# Patient Record
Sex: MALE | Race: AMERICAN INDIAN OR ALASKA NATIVE
[De-identification: names, ages, dates, MRNs, and addresses within clinical notes are randomized per-mention and may not be internally consistent; named-entity substitution may affect disease eponyms.]

---

## 2017-02-06 ENCOUNTER — HOSPITAL ENCOUNTER (INPATIENT)
Dept: HOSPITAL 5 - ED | Age: 47
LOS: 1 days | Discharge: HOME | DRG: 699 | End: 2017-02-07
Attending: INTERNAL MEDICINE | Admitting: INTERNAL MEDICINE
Payer: MEDICARE

## 2017-02-06 DIAGNOSIS — Z82.49: ICD-10-CM

## 2017-02-06 DIAGNOSIS — Z99.2: ICD-10-CM

## 2017-02-06 DIAGNOSIS — I12.0: ICD-10-CM

## 2017-02-06 DIAGNOSIS — R63.4: ICD-10-CM

## 2017-02-06 DIAGNOSIS — R65.10: ICD-10-CM

## 2017-02-06 DIAGNOSIS — R18.8: ICD-10-CM

## 2017-02-06 DIAGNOSIS — N18.6: ICD-10-CM

## 2017-02-06 DIAGNOSIS — D64.9: ICD-10-CM

## 2017-02-06 DIAGNOSIS — N28.1: Primary | ICD-10-CM

## 2017-02-06 DIAGNOSIS — E83.51: ICD-10-CM

## 2017-02-06 LAB
ANION GAP SERPL CALC-SCNC: 25 MMOL/L
BASOPHILS NFR BLD AUTO: 0.4 % (ref 0–1.8)
BUN SERPL-MCNC: 54 MG/DL (ref 9–20)
BUN/CREAT SERPL: 2.85 %
CALCIUM SERPL-MCNC: 7.2 MG/DL (ref 8.4–10.2)
CHLORIDE SERPL-SCNC: 86.2 MMOL/L (ref 98–107)
CHOLEST SERPL-MCNC: 145 MG/DL (ref 50–199)
CO2 SERPL-SCNC: 28 MMOL/L (ref 22–30)
EOSINOPHIL NFR BLD AUTO: 0.6 % (ref 0–4.3)
GLUCOSE SERPL-MCNC: 103 MG/DL (ref 75–100)
HCT VFR BLD CALC: 27 % (ref 35.5–45.6)
HDLC SERPL-MCNC: 38 MG/DL (ref 40–59)
HGB BLD-MCNC: 8.4 GM/DL (ref 11.8–15.2)
MCH RBC QN AUTO: 24 PG (ref 28–32)
MCHC RBC AUTO-ENTMCNC: 31 % (ref 32–34)
MCV RBC AUTO: 77 FL (ref 84–94)
PLATELET # BLD: 203 K/MM3 (ref 140–440)
POTASSIUM SERPL-SCNC: 3.3 MMOL/L (ref 3.6–5)
RBC # BLD AUTO: 3.52 M/MM3 (ref 3.65–5.03)
SODIUM SERPL-SCNC: 136 MMOL/L (ref 137–145)
TRIGL SERPL-MCNC: 89 MG/DL (ref 2–149)
WBC # BLD AUTO: 11.6 K/MM3 (ref 4.5–11)

## 2017-02-06 PROCEDURE — 82553 CREATINE MB FRACTION: CPT

## 2017-02-06 PROCEDURE — 89051 BODY FLUID CELL COUNT: CPT

## 2017-02-06 PROCEDURE — 93010 ELECTROCARDIOGRAM REPORT: CPT

## 2017-02-06 PROCEDURE — 87116 MYCOBACTERIA CULTURE: CPT

## 2017-02-06 PROCEDURE — 71020: CPT

## 2017-02-06 PROCEDURE — 93306 TTE W/DOPPLER COMPLETE: CPT

## 2017-02-06 PROCEDURE — 80061 LIPID PANEL: CPT

## 2017-02-06 PROCEDURE — 87040 BLOOD CULTURE FOR BACTERIA: CPT

## 2017-02-06 PROCEDURE — 74176 CT ABD & PELVIS W/O CONTRAST: CPT

## 2017-02-06 PROCEDURE — 82550 ASSAY OF CK (CPK): CPT

## 2017-02-06 PROCEDURE — 76770 US EXAM ABDO BACK WALL COMP: CPT

## 2017-02-06 PROCEDURE — 93005 ELECTROCARDIOGRAM TRACING: CPT

## 2017-02-06 PROCEDURE — 87400 INFLUENZA A/B EACH AG IA: CPT

## 2017-02-06 PROCEDURE — 84484 ASSAY OF TROPONIN QUANT: CPT

## 2017-02-06 PROCEDURE — 36415 COLL VENOUS BLD VENIPUNCTURE: CPT

## 2017-02-06 PROCEDURE — 85025 COMPLETE CBC W/AUTO DIFF WBC: CPT

## 2017-02-06 PROCEDURE — 96361 HYDRATE IV INFUSION ADD-ON: CPT

## 2017-02-06 PROCEDURE — 96374 THER/PROPH/DIAG INJ IV PUSH: CPT

## 2017-02-06 PROCEDURE — 80048 BASIC METABOLIC PNL TOTAL CA: CPT

## 2017-02-06 NOTE — ADMIT CRITERIA FORM
Admission Criteria Documentation: 





                         RENAL FAILURE, CHRONIC





Clinical Indications for Admission to Inpatient Care





                                                                     (Place 'X' 

for any and all applicable criteria):





Admission is indicated for ANY ONE of the following (1)(2)(3)(4)(5):


[X ]I.   Inpatient admission required rather than observation care (Use Renal 

Failure, Chronic: Observation Care 


        Criteria as appropriate) because of ANY ONE of the following:


                [ ]a)   Volume overload or uremic symptoms (eg, clinically 

significant pulmonary edema, hypertension, 


                        pericarditis, acidosis) too severe for, or not 

responsive (eg, for over 24 hours) to emergency 


                        department or observation care dialysis or treatment 

regimen (11)


                [ ]b)  Hemodynamic instability that is severe or persistent


                [ ]c)  Respiratory distress that is severe or persistent (11)


                [ ]d)  Clinically significant electrolyte abnormality that 

requires inpatient care (eg,hyperkalemia with 


                        severe ECG findings)[B]


                [ ]e)  Supplement O2 or respiratory therapy for over 24hrs that 

is performable only in acute inpatient setting


                [ ]f)   Continuous IV infusion of anticoagulation, platelet 

inhibitor, vasoactive, or Antiarrhythmic medication (15),


                [ ]g)  Pulmonary artery catheter monitoring               


                [ ]h)  Temporary pacemaker placement


                [ ]i)   Emergent pericardiocentesis                      


                [X ]j)   Other condition, treatment or monitoring requiring 

inpatient admission


[ ]II.   Unexplained syncope [A]


[ ]III.  Recurrent seizures 


[ ]IV. Severe infections not treatable in outpatient setting (eg, peritonitis)(9

)


[ ]V.  Cardiac arrhythmias of immediate concern


[ ]VI. Encephalopathy


[ ]VII.Bleeding abnormalities (eg, platelet dysfunction) with active (eg, 

gastrointestinal)  bleeding











Extended stay beyond goal length of stay may be needed for (3)(4)(35)(36):


[ ]a)   Continuing uremic complications


[ ]b)   Comorbidities or complications


   


     


                                                                          


The original [employer] content created by [employer] has been revised. 


The portions of the content which have been revised are identified through the 

use of italic text or in bold, and [facility] 


has neither reviewed nor approved the modified material. All other unmodified 

content is copyright  [employer].                              





Please see references footnoted in the original [facility] edition 

2016








Admission Criteria Met: Yes

## 2017-02-06 NOTE — EMERGENCY DEPARTMENT REPORT
ED General Adult HPI





- General


Chief complaint: Dyspnea/Respdistress


Stated complaint: BACK PAIN/N/V FEVER/WEIGHT LOSS


Time Seen by Provider: 17 20:27


Source: patient


Mode of arrival: Ambulatory


Limitations: No Limitations





- History of Present Illness


Initial comments: 





46-year-old gentleman who indicates for the past 2-3 days he's had some 

generalized malaise.  He reports body aches as well.  He's had pains in his 

legs and his left flank area.  He describes fever up to 101.  He reports 

anorexia with this as well.  He reports some nausea as well.  He indicates that 

he has peritoneal dialysis.  He is anuric.  He reports as well mild cough.  

Denies headache.  Eyes any chest pain or shortness of breath.  Denies any 

change in his dialysate schedule.


Onset/Timin


-: days(s)


Location: abdomen


Radiation: non-radiation


Severity scale (0 -10): 10


Quality: dull


Consistency: constant


Improves with: none


Worsens with: none


Associated Symptoms: cough, fever/chills, loss of appetite, malaise, nausea/

vomiting





- Related Data


 Home Medications











 Medication  Instructions  Recorded  Confirmed  Last Taken


 


Calcium Carbonate [Calcium] 1 tab PO BID 13


 


Cinacalcet HCl [Sensipar] 60 mg PO QHS 13


 


Labetalol HCl [Trandate TAB] 300 mg PO BID 13


 


NIFEdipine [NIFEdipine ER] 90 mg PO DAILY 13


 


Tamsulosin HCl 0.4 mg PO DAILY 13


 


Valsartan [Diovan] 160 mg PO BID 13


 


cloNIDine [Catapres] 0.1 mg PO DAILY 02/09/15 02/06/17 02/06/17


 


Sevelamer Carbonate [Renvela] 800 mg PO TIDWM 10/22/16 02/06/17 02/06/17











 Allergies











Allergy/AdvReac Type Severity Reaction Status Date / Time


 


No Known Allergies Allergy   Verified 02/08/15 19:47














ED Review of Systems


ROS: 


Stated complaint: BACK PAIN/N/V FEVER/WEIGHT LOSS


Other details as noted in HPI





Constitutional: fever, malaise.  denies: chills


Eyes: denies: eye pain, eye discharge, vision change


ENT: denies: ear pain, throat pain


Respiratory: cough.  denies: shortness of breath, wheezing


Cardiovascular: denies: chest pain, palpitations


Endocrine: no symptoms reported


Gastrointestinal: nausea.  denies: abdominal pain, diarrhea, constipation


Genitourinary: denies: urgency, dysuria


Musculoskeletal: denies: back pain, joint swelling, arthralgia


Skin: denies: rash, lesions


Neurological: denies: headache, weakness, paresthesias


Psychiatric: denies: anxiety, depression


Hematological/Lymphatic: denies: easy bleeding, easy bruising





ED Past Medical Hx





- Past Medical History


Previous Medical History?: Yes


Hx Hypertension: Yes


Hx Renal Disease: Yes (PD patient)


Hx HIV: No





- Surgical History


Past Surgical History?: Yes


Additional Surgical History: PD access.  Hernia repair.  R & L knee surgery





- Social History


Smoking Status: Never Smoker


Substance Use Type: None





- Medications


Home Medications: 


 Home Medications











 Medication  Instructions  Recorded  Confirmed  Last Taken  Type


 


Calcium Carbonate [Calcium] 1 tab PO BID 13 History


 


Cinacalcet HCl [Sensipar] 60 mg PO QHS 13 History


 


Labetalol HCl [Trandate TAB] 300 mg PO BID 13 History


 


NIFEdipine [NIFEdipine ER] 90 mg PO DAILY 13 History


 


Tamsulosin HCl 0.4 mg PO DAILY 13 History


 


Valsartan [Diovan] 160 mg PO BID 13 History


 


cloNIDine [Catapres] 0.1 mg PO DAILY 02/09/15 02/06/17 02/06/17 History


 


Sevelamer Carbonate [Renvela] 800 mg PO TIDWM 10/22/16 02/06/17 02/06/17 History














ED Physical Exam





- General


Limitations: No Limitations


General appearance: alert, in distress (mild- appears very fatigued)





- Head


Head exam: Present: atraumatic, normocephalic





- Eye


Eye exam: Present: normal appearance, EOMI.  Absent: scleral icterus





- ENT


ENT exam: Present: normal orophraynx, mucous membranes moist





- Neck


Neck exam: Present: normal inspection





- Respiratory


Respiratory exam: Present: normal lung sounds bilaterally.  Absent: respiratory 

distress





- Cardiovascular


Cardiovascular Exam: Present: regular rate, normal rhythm.  Absent: systolic 

murmur, diastolic murmur, rubs, gallop





- GI/Abdominal


GI/Abdominal exam: Present: soft, normal bowel sounds (distant).  Absent: 

tenderness, guarding





- Rectal


Rectal exam: Present: deferred





- Extremities Exam


Extremities exam: Present: normal inspection





- Back Exam


Back exam: Present: full ROM, tenderness, CVA tenderness (L)





- Neurological Exam


Neurological exam: Present: alert, oriented X3





- Psychiatric


Psychiatric exam: Present: normal affect, normal mood





- Skin


Skin exam: Present: warm, dry, intact, normal color.  Absent: rash





ED Course


 Vital Signs











  17





  16:49 19:58 19:59


 


Temperature 99.4 F  99.2 F


 


Pulse Rate 94 H  84


 


Respiratory 18  18





Rate   


 


Blood Pressure 150/106  


 


Blood Pressure   152/80





[Left]   


 


O2 Sat by Pulse 100 96 96





Oximetry   














  17





  20:00 20:10 20:20


 


Temperature   


 


Pulse Rate  82 81


 


Respiratory  11 L 13





Rate   


 


Blood Pressure 152/80 152/80 150/85


 


Blood Pressure   





[Left]   


 


O2 Sat by Pulse 95 98 99





Oximetry   














  17





  20:30 20:40 20:50


 


Temperature   


 


Pulse Rate 81 85 82


 


Respiratory 14 18 18





Rate   


 


Blood Pressure 150/85 161/95 157/89


 


Blood Pressure   





[Left]   


 


O2 Sat by Pulse  95 95





Oximetry   














  17





  21:00 21:10 21:20


 


Temperature   


 


Pulse Rate 90 86 89


 


Respiratory 23 12 18





Rate   


 


Blood Pressure 157/89 155/89 165/90


 


Blood Pressure   





[Left]   


 


O2 Sat by Pulse  98 96





Oximetry   














  17





  21:30 21:35 21:40


 


Temperature   


 


Pulse Rate 84  83


 


Respiratory 14 18 15





Rate   


 


Blood Pressure 165/90  156/89


 


Blood Pressure   





[Left]   


 


O2 Sat by Pulse  100 97





Oximetry   














  17





  21:50 22:00 22:10


 


Temperature   


 


Pulse Rate 88 89 87


 


Respiratory 20 24 12





Rate   


 


Blood Pressure 148/88 148/88 157/84


 


Blood Pressure   





[Left]   


 


O2 Sat by Pulse 97 96 97





Oximetry   














  17





  23:14 23:21


 


Temperature  100.7 F H


 


Pulse Rate 85 


 


Respiratory 23 





Rate  


 


Blood Pressure 157/84 


 


Blood Pressure  





[Left]  


 


O2 Sat by Pulse 97 98





Oximetry  














- Reevaluation(s)


Reevaluation #1: 





17 20:44


ECG at 1701 demonstrating normal sinus rhythm at 85 bpm with isoelectric normal 

WI and QRS is noted LVH is noted.  With nonspecific ST-T wave changes.  ECG is 

unchanged compared to ECG from 10 2016


Reevaluation #2: 





17 00:19


I discussed the results back and dialysate fluid it is negative for peritonitis 

was for fever.  Influenza swab was negative as well.  Primary reason for 

wanting to bring this patient is a hospitalist due to the elevated troponin 

level.  Dr. Vasquez contacted and is agreeable with plan.





ED Medical Decision Making





- Lab Data


Result diagrams: 


 17 18:06





 17 18:06


Critical care attestation.: 


If time is entered above; I have spent that time in minutes in the direct care 

of this critically ill patient, excluding procedure time.








ED Disposition


Clinical Impression: 


 Cardiac enzymes elevated, End stage renal disease





Fever


Qualifiers:


 Fever type: unspecified Qualified Code(s): R50.9 - Fever, unspecified





Disposition: OP ADMITTED AS IP TO THIS HOSP


Is pt being admited?: Yes


Does the pt Need Aspirin: No


Condition: Stable


Referrals: 


DR VON [Other] - 3-5 Days


Time of Disposition: 00:20

## 2017-02-07 VITALS — DIASTOLIC BLOOD PRESSURE: 89 MMHG | SYSTOLIC BLOOD PRESSURE: 146 MMHG

## 2017-02-07 LAB
BASOPHILS NFR FLD MANUAL: 1 %
CK SERPL-CCNC: 444 UNITS/L (ref 55–170)
CK SERPL-CCNC: 469 UNITS/L (ref 55–170)
EOSINOPHIL NFR FLD: 1 %
LYMPHOCYTES BF: 15 %
MONOCYTES # FLD: 48 %
REACTIVE LYMPH BODY FLUID: 0 %
SEG NEUTROPHILS BODY FLUID: 35 %

## 2017-02-07 NOTE — CONSULTATION
REASON FOR CONSULTATION:  Renal failure.



HISTORY OF PRESENT ILLNESS:  This 46-year-old -American male with history

of hypertension, end-stage renal disease, on peritoneal dialysis for the past

4-1/2 years, presented to the Emergency Room with left flank pain, fever,

nausea, and poor appetite.  The patient states that he lost 14 pounds in the

past 4-5 days as he is unable to eat.  The patient went to the dialysis clinic

in [place] yesterday and peritoneal fluid was clear and he was advised to come

to the Emergency Room for further evaluation.



The patient does peritoneal dialysis with the cycler at night time.  He states

that his peritoneal fluid has been clear.  The patient denies blood in the

stools.  Denies cold, cough, or sore throat.  Feeling weak, loss of appetite.



PAST MEDICAL HISTORY:  Hypertension, end-stage renal disease, hernia repair.



PERSONAL HISTORY:  Denies smoking, alcohol, or drug abuse.



FAMILY HISTORY:  No family history of kidney failure.



ALLERGIES:  No known allergies.



HOME MEDICATIONS:  Calcium carbonate 1 twice a day, Sensipar 60 mg once a day,

labetalol 300 mg twice a day, nifedipine 90 mg a day, Tamsulosin 0.4 mg once a

day, Diovan 160 mg twice a day, clonidine 0.1 mg once a day, Renvela 2 with each

meal.



REVIEW OF SYSTEMS:  Denies chest pain or shortness of breath.  The patient

denies cold, cough, or sore throat.  Denies difficulty swallowing.  The patient

denies abdomen pain.  Complaints of nausea, had low grade fevers, poor appetite,

lost 14 pounds in the past 4 days.  Denies diarrhea or constipation.  Denies GI

bleeding.  Complains of pain over the left lower back area near the kidney. 

Denies lifting weight or trauma.  Denies swelling of the legs.  Complains of

cramps in the legs sometimes: 



REVIEW OF SYSTEMS:  All other review of systems reviewed and negative.



PHYSICAL EXAMINATION:

GENERAL:  The patient is alert, oriented, well developed pleasant male, not in

acute distress.

VITAL SIGNS:  Blood pressure 140/77, pulse 77, temperature 100.7.

HEENT:  Eyes pupils reactive.  Conjunctivae pale.  Oral mucosa, tongue, and lips

are dry.

NECK:  Supple, no JVD, no thyroid enlargement.

LUNGS:  Clear.

HEART:  S1, S2 regular.  A 2/6 systolic murmur along the left sternal border. 

No pericardial rub.

ABDOMEN:  Soft, bowel sounds present.  PD catheter in place.  Nontender.

EXTREMITIES:  No edema.

BACK:  Mild left CVA tenderness below the lower rib cage.



LABORATORY DATA:  WBC 11.6, hemoglobin 8.4, hematocrit 27.0, MCV 77, platelets

203.  Sodium 136, potassium 3.3, chloride 86, CO2 28, BUN 54, creatinine 18.9,

glucose 103, calcium 7.2.  Peritoneal fluid, WBC 11, .  Chest x-rays, no

acute findings.  Renal ultrasound, multiple bilateral renal cysts, lesion in the

lower pole of the left kidney, does not appear vascular.  CT scan of the abdomen

and pelvis was reported to have enumerable bilateral renal cysts with a

scattered calcification, left perinephric stranding, a lesion in the lower pole

of the left kidney is heterogeneous and mixed attenuation measuring 6 x 5.5 cm

would be hemorrhagic renal cyst.  Renal abscess or renal neoplasm was felt to be

less likely.



ASSESSMENT AND PLAN:

1.  Left flank pain, most likely due to ruptured cyst, hemorrhagic cyst, or

infected cyst.  The patient feels better after receiving IV Rocephin.

2.  End stage renal disease.

3.  Hypertension.

4.  Anemia.

5.  Hypocalcemia.

6.  Weight loss of 14 pounds in the past 4-5 days.



Clinically, does not appear to have acute peritonitis.  The patient is able to

tolerate oral foods.  The patient may go home on oral ciprofloxacin 250 mg p.o.

twice a day for 14 days.  Follow up on the CT scan of the abdomen and pelvis

with contrast after antibiotic course.  The patient to resume peritoneal

dialysis with cycler at home.  Calcium is noted to be 7.2.  Check on albumin and

ionized calcium.  Hold off on Sensipar for now till the calcium levels are

better.



Discussed with Dr. Kingston, the hospitalist.  Also, discussed with Dr. Sterling,

patient's nephrologist, who will follow up the patient in the PD clinic and get

a followup CT scan with contrast in 2 weeks.





DD: 02/07/2017 10:06

DT: 02/07/2017 11:52

JOB# [id number]  [id number]

K/ROSAURA

## 2017-02-07 NOTE — HISTORY AND PHYSICAL REPORT
History of Present Illness


Date of examination: 02/07/17


History of present illness: 


46-year-old man with a history of end-stage renal disease on peritoneal dialysis

, hypertension comes emergency room with left flank pain, fever, chills.  Also 

complaining of decreased appetite and weight loss 14 pounds over 3 days and 

shortness of breath


Patient denies chest pain, palpitation, shortness of breath, cough, abdominal 

pain, hematochezia, dysuria, frequency, focal weakness, dysarthria, polydipsia 

polyuria, hot or cold intolerance, easy bruisability, or rash or bleeding from 

mucosal membrane, rhinorrhea, epistaxis, earache, tinnitus, blurry vision, eye 

discharge, anxiety, depression. Other review of systems negative





PAST SURGICAL HISTORY: Hernia repair, bilateral knee





SOCIAL HISTORY: Denies alcohol, tobacco, drugs





FAMILY HISTORY: Hypertention








Medications and Allergies


 Allergies











Allergy/AdvReac Type Severity Reaction Status Date / Time


 


No Known Allergies Allergy   Verified 02/08/15 19:47











 Home Medications











 Medication  Instructions  Recorded  Confirmed  Last Taken  Type


 


Calcium Carbonate [Calcium] 1 tab PO BID 11/21/13 02/06/17 02/06/17 History


 


Cinacalcet HCl [Sensipar] 60 mg PO QHS 11/21/13 02/06/17 02/06/17 History


 


Labetalol HCl [Trandate TAB] 300 mg PO BID 11/21/13 02/06/17 02/06/17 History


 


NIFEdipine [NIFEdipine ER] 90 mg PO DAILY 11/21/13 02/06/17 02/06/17 History


 


Tamsulosin HCl 0.4 mg PO DAILY 11/21/13 02/06/17 02/06/17 History


 


Valsartan [Diovan] 160 mg PO BID 11/21/13 02/06/17 02/06/17 History


 


cloNIDine [Catapres] 0.1 mg PO DAILY 02/09/15 02/06/17 02/06/17 History


 


Sevelamer Carbonate [Renvela] 800 mg PO TIDWM 10/22/16 02/06/17 02/06/17 History


 


Ciprofloxacin HCl [Ciprofloxacin 250 mg PO BID #28 tablet 02/07/17  Unknown Rx





TAB]     


 


oxyCODONE /ACETAMINOPHEN [Percocet 1 tab PO Q6H PRN #20 tablet 02/07/17  

Unknown Rx





5/325 mg]     











Active Meds: 


Active Medications





Ceftriaxone Sodium (Rocephin/Ns 1 Gm/50 Ml)   mls @ 100 mls/hr IV Q24H PHUONG











Exam





- Physical Exam


Narrative exam: 


Gen. appearance: Patient lying in bed, no apparent distress


HEENT: Normocephalic, atraumatic, pupils equally round and reactive to light, 

extraocular movement intact, and no sclericterus,. No JVD or thyromegaly or 

nodule,neck supple, no carotid bruit ,mucous membranes moist, no exudate or 

erythema


Heart: S1, S2, regular rate and rhythm


Lungs: Clear to auscultation bilaterally, breathing comfortable


Abdomen: Positive bowel sounds, nontender, nondistended, no organomegaly


Extremity: No edema, cyanosis, clubbing


Skin: No rash, nodules, warm, dry


Neuro: Oriented 3, cranial nerves II-12 intact, speech is fluent, motor and 

sensory intact








- Constitutional


Vitals: 


 











Temp Pulse Resp BP Pulse Ox


 


 100.7 F H  77   19   140/77   95 


 


 02/06/17 23:21  02/07/17 01:00  02/07/17 01:00  02/07/17 01:00  02/07/17 01:00














Results





- Labs


CBC & Chem 7: 


 02/06/17 18:06





 02/06/17 18:06


Labs: 


 Abnormal lab results











  02/06/17 02/06/17 02/06/17 Range/Units





  18:06 18:06 21:53 


 


WBC   11.6 H   (4.5-11.0)  K/mm3


 


RBC   3.52 L   (3.65-5.03)  M/mm3


 


Hgb   8.4 L   (11.8-15.2)  gm/dl


 


Hct   27.0 L   (35.5-45.6)  %


 


MCV   77 L   (84-94)  fl


 


MCH   24 L   (28-32)  pg


 


MCHC   31 L   (32-34)  %


 


RDW   20.6 H   (13.2-15.2)  %


 


Lymph % (Auto)   8.1 L   (13.4-35.0)  %


 


Mono % (Auto)   11.6 H   (0.0-7.3)  %


 


Lymph #   0.9 L   (1.2-5.4)  K/mm3


 


Mono #   1.3 H   (0.0-0.8)  K/mm3


 


Seg Neutrophils %   79.3 H   (40.0-70.0)  %


 


Seg Neutrophils #   9.2 H   (1.8-7.7)  K/mm3


 


Sodium  136 L    (137-145)  mmol/L


 


Potassium  3.3 L    (3.6-5.0)  mmol/L


 


Chloride  86.2 L    ()  mmol/L


 


BUN  54 H    (9-20)  mg/dL


 


Creatinine  18.9 H    (0.8-1.5)  mg/dL


 


Glucose  103 H    ()  mg/dL


 


Calcium  7.2 L    (8.4-10.2)  mg/dL


 


Troponin T  0.413 H*   0.407 H*  (0.00-0.029)  ng/mL


 


HDL Cholesterol  38 L    (40-59)  mg/dL














Assessment and Plan


SIRS


Flank pain


Elevated troponin


ESRD on PD


Hypertension





Admits medicine


Start empiric IV Rocephin, obtain blood cultures


Check CT abdomen and pelvis, echo, cardiac enzymes


Consult renal, percocet for pain, start DVT prophylaxis

## 2017-02-07 NOTE — DISCHARGE SUMMARY
Providers





- Providers


Date of Admission: 


02/07/17 01:32





Date of discharge: 02/07/17


Attending physician: 


SHENA MIRANDA








nephrology





Hospitalization


Reason for admission: flank pain


Condition: Stable


Hospital course: 


This is a 46-year-old male who presented through the emergency department with 

complaints of left flank pain.  Patient underwent CT scan of the abdomen and 

pelvis which revealed a change in the appearance of the lower pole of left 

kidney from previous scan.  Patient had a larger heterogeneous lesion noted 

most likely related to hemorrhagic renal cyst.  Renal abscess or renal neoplasm 

felt to be less likely.  Scan also revealed polycystic kidney disease and with 

peritoneal dialysis catheter and ascites.  Nephrology saw the patient in 

consultation and felt that the left flank pain was due to ruptured/hemorrhagic/

infected renal cyst.  Patient was clinically stable and no significant 

leukocytosis therefore peritonitis was unlikely.  Nephrology recommended 

discharge with ciprofloxacin 250 mg twice a day 2 weeks.  Patient is to have 

outpatient follow-up CT scan renal ultrasound.  Patient did have elevated 

troponin which was felt to be secondary to renal disease.  Echocardiogram was 

completed and is pending.  Patient is to follow-up with echocardiogram results.

  Patient denies chest pain.  Dedicated discharge time 35 minutes.





Disposition: DISCHARGED TO HOME OR SELFCARE





- Discharge Diagnoses


(1) Ruptured cyst of kidney


Status: Acute   





(2) End stage renal disease


Status: Chronic   





(3) ESRD on peritoneal dialysis


Status: Chronic   





Core Measure Documentation





- Palliative Care


Palliative Care/ Comfort Measures: Not Applicable





- Core Measures


Any of the following diagnoses?: none





Exam





- Constitutional


Vitals: 


 











Temp Pulse Resp BP Pulse Ox


 


 98.4 F   78   12   170/100   98 


 


 02/07/17 06:03  02/07/17 06:03  02/07/17 06:03  02/07/17 06:03  02/07/17 06:03











General appearance: Present: no acute distress, well-nourished





- EENT


Eyes: Present: PERRL


ENT: hearing intact, clear oral mucosa





- Neck


Neck: Present: supple, normal ROM





- Respiratory


Respiratory effort: normal


Respiratory: bilateral: CTA





- Cardiovascular


Heart Sounds: Present: S1 & S2.  Absent: rub, click





- Extremities


Extremities: pulses symmetrical, No edema


Peripheral Pulses: within normal limits





- Abdominal


General gastrointestinal: Present: soft, non-tender, non-distended, normal 

bowel sounds


Male genitourinary: Present: normal





- Integumentary


Integumentary: Present: clear, warm, dry





- Musculoskeletal


Musculoskeletal: gait normal, strength equal bilaterally





- Psychiatric


Psychiatric: appropriate mood/affect, intact judgment & insight





- Neurologic


Neurologic: CNII-XII intact, moves all extremities





Plan


Activity: no restrictions


Weight Bearing Status: Full Weight Bearing


Diet: renal


Follow up with: 


DR VON [Other] - 3-5 Days


Prescriptions: 


Ciprofloxacin HCl [Ciprofloxacin TAB] 250 mg PO BID #28 tablet


oxyCODONE /ACETAMINOPHEN [Percocet 5/325 mg] 1 tab PO Q6H PRN #20 tablet


 PRN Reason: Pain, Moderate (4-6)

## 2017-02-07 NOTE — CONSULTATION
History of Present Illness





- Reason for Consult


Consult date: 02/07/17





- History of Present Illness





consult dictated. Left flank pain may be due to ruptured /hemorrhagic /infected 

renal cyst. Clinically stable. CT scan of abdomen, renal ultrasound, CXR  

reviewed, PD fluid cell count -no significant WBCs-doubt peritonitis. Pt has no 

vomitings, able to eat. Discussed with --ok to discharge pt on oral 

Ciprofloxacin 250 mg bid x 2 wks. Spoke with (pt's nephrologist) to 

follow him in PD clinic with follow up on CT scan or renal ultrasound. 





Medications and Allergies


 Allergies











Allergy/AdvReac Type Severity Reaction Status Date / Time


 


No Known Allergies Allergy   Verified 02/08/15 19:47











 Home Medications











 Medication  Instructions  Recorded  Confirmed  Last Taken  Type


 


Calcium Carbonate [Calcium] 1 tab PO BID 11/21/13 02/06/17 02/06/17 History


 


Cinacalcet HCl [Sensipar] 60 mg PO QHS 11/21/13 02/06/17 02/06/17 History


 


Labetalol HCl [Trandate TAB] 300 mg PO BID 11/21/13 02/06/17 02/06/17 History


 


NIFEdipine [NIFEdipine ER] 90 mg PO DAILY 11/21/13 02/06/17 02/06/17 History


 


Tamsulosin HCl 0.4 mg PO DAILY 11/21/13 02/06/17 02/06/17 History


 


Valsartan [Diovan] 160 mg PO BID 11/21/13 02/06/17 02/06/17 History


 


cloNIDine [Catapres] 0.1 mg PO DAILY 02/09/15 02/06/17 02/06/17 History


 


Sevelamer Carbonate [Renvela] 800 mg PO TIDWM 10/22/16 02/06/17 02/06/17 History











Active Meds: 


Active Medications





Acetaminophen (Tylenol)  650 mg PO Q4H PRN


   PRN Reason: Pain MILD(1-3)/Fever >100.5/HA


Bisacodyl (Dulcolax)  10 mg MA QDAY PRN


   PRN Reason: Constipation unrelieved by MOM


Ceftriaxone Sodium (Rocephin/Ns 1 Gm/50 Ml)  1 gm in 50 mls @ 100 mls/hr IV 

Q24H PHUONG


   Last Admin: 02/07/17 02:40 Dose:  100 mls/hr


Ondansetron HCl (Zofran)  4 mg IV Q8H PRN


   PRN Reason: N/V unrelieved by Reglan


Oxycodone/Acetaminophen (Percocet 5/325)  1 tab PO Q6H PRN


   PRN Reason: Pain, Moderate (4-6)











Exam





- Constitutional


Vitals: 


 











Temp Pulse Resp BP Pulse Ox


 


 98.4 F   78   12   170/100   98 


 


 02/07/17 06:03  02/07/17 06:03  02/07/17 06:03  02/07/17 06:03  02/07/17 06:03














Results





- Labs


CBC & Chem 7: 


 02/06/17 18:06





 02/06/17 18:06


Labs: 


 Abnormal lab results











  02/07/17 Range/Units





  08:02 


 


Total Creatine Kinase  444 H  ()  units/L


 


Troponin T  0.422 H*  (0.00-0.029)  ng/mL

## 2017-02-07 NOTE — CAT SCAN REPORT
FINAL REPORT



EXAM:  CT ABDOMEN PELVIS WO CON



HISTORY:  flank pain  LEFT 



TECHNIQUE: CT abdomen and pelvis without contrast.  Multiplanar

reformations.



PRIORS: 10/5/2016



FINDINGS:



Solid organ evaluation limited from lack of IV contrast.



Lung bases show no significant abnormality.  No free

intraperitoneal gas seen.



Moderate ascites compatible with peritoneal dialysis.



Liver shows no significant abnormality.  Normal biliary tree.



Spleen shows no significant abnormality.



Adrenal glands show no significant abnormality.



Pancreas shows no significant abnormality.



Polycystic kidney disease noted with innumerable bilateral renal

cysts with scattered calcifications.  There is left perinephric

stranding.  There is a lesion in the lower pole of left kidney

which is heterogeneous and mixed attenuation, some areas of high

and low attenuation noted.  This measures about 6 x 5 x 5.5 cm,

previously was about 4.3 x 4.0 x 3.3 cm.



Abdominal aorta is non-aneurysmal.



Fat-containing umbilical hernia.  No bowel obstruction.  Dialysis

catheter noted in the pelvis.  Appendix not seen with certainty.



IMPRESSION:



1. Interval change in the appearance of the lower pole of left

kidney, a larger heterogeneous lesion is noted, most likely

etiology given fairly recent previous study would be hemorrhagic

renal cyst.  Renal abscess or renal neoplasm felt to be less

likely but not excluded.  Consider follow-up with IV contrast for

further evaluation.



2. Polycystic kidney disease. Peritoneal dialysis with peritoneal

dialysis catheter and ascites.
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
## 2017-02-07 NOTE — ULTRASOUND REPORT
FINAL REPORT



EXAM:  US RENAL BILAT



HISTORY:  flank pain 



TECHNIQUE: Complete ultrasound of the kidneys.



PRIORS: CT scan from 2/6/2017



FINDINGS:



Right kidney measures 9.9 x 5.0 x 5.3 cm.  Renal cortex is 1.3 cm

thick.  Left kidney measures 11.6 x 4.8 x 6.1 cm, renal cortex is

1.8 cm thick.  Lesion in the lower pole of left kidney that was

visualized on CT is not as well seen on ultrasound, but does not

appear particularly vascular suggesting either a neoplasm or

hemorrhagic cyst more likely than abscess.  Multiple renal cysts

bilaterally compatible with polycystic kidney disease.  Bladder

suboptimally visualized.



IMPRESSION:



1. Polycystic kidney disease.  Lesion lower pole left kidney

better seen on CT, but not particularly vascular on ultrasound

suggesting neoplasm or hemorrhagic cyst more likely than abscess.

 If possible, CT with and without contrast recommended.

## 2017-12-27 ENCOUNTER — HOSPITAL ENCOUNTER (EMERGENCY)
Dept: HOSPITAL 5 - ED | Age: 47
Discharge: HOME | End: 2017-12-27
Payer: MEDICARE

## 2017-12-27 VITALS — SYSTOLIC BLOOD PRESSURE: 177 MMHG | DIASTOLIC BLOOD PRESSURE: 110 MMHG

## 2017-12-27 DIAGNOSIS — Z99.2: ICD-10-CM

## 2017-12-27 DIAGNOSIS — N18.6: ICD-10-CM

## 2017-12-27 DIAGNOSIS — I12.0: Primary | ICD-10-CM

## 2017-12-27 LAB
ALBUMIN SERPL-MCNC: 3.2 G/DL (ref 3.9–5)
ALBUMIN/GLOB SERPL: 1.2 %
ALP SERPL-CCNC: 119 UNITS/L (ref 35–129)
ALT SERPL-CCNC: 15 UNITS/L (ref 7–56)
ANION GAP SERPL CALC-SCNC: 26 MMOL/L
BASOPHILS NFR BLD AUTO: 1.3 % (ref 0–1.8)
BILIRUB SERPL-MCNC: 0.3 MG/DL (ref 0.1–1.2)
BUN SERPL-MCNC: 66 MG/DL (ref 9–20)
BUN/CREAT SERPL: 4 %
CALCIUM SERPL-MCNC: 6.8 MG/DL (ref 8.4–10.2)
CHLORIDE SERPL-SCNC: 91.4 MMOL/L (ref 98–107)
CO2 SERPL-SCNC: 25 MMOL/L (ref 22–30)
EOSINOPHIL NFR BLD AUTO: 5.6 % (ref 0–4.3)
GLUCOSE SERPL-MCNC: 81 MG/DL (ref 75–100)
HCT VFR BLD CALC: 30.3 % (ref 35.5–45.6)
HGB BLD-MCNC: 9.6 GM/DL (ref 11.8–15.2)
INR PPP: 1.05 (ref 0.87–1.13)
MCH RBC QN AUTO: 25 PG (ref 28–32)
MCHC RBC AUTO-ENTMCNC: 32 % (ref 32–34)
MCV RBC AUTO: 78 FL (ref 84–94)
PLATELET # BLD: 145 K/MM3 (ref 140–440)
POTASSIUM SERPL-SCNC: 4.3 MMOL/L (ref 3.6–5)
PROT SERPL-MCNC: 5.8 G/DL (ref 6.3–8.2)
RBC # BLD AUTO: 3.89 M/MM3 (ref 3.65–5.03)
SODIUM SERPL-SCNC: 138 MMOL/L (ref 137–145)
WBC # BLD AUTO: 5.6 K/MM3 (ref 4.5–11)

## 2017-12-27 PROCEDURE — 84550 ASSAY OF BLOOD/URIC ACID: CPT

## 2017-12-27 PROCEDURE — 85610 PROTHROMBIN TIME: CPT

## 2017-12-27 PROCEDURE — 85025 COMPLETE CBC W/AUTO DIFF WBC: CPT

## 2017-12-27 PROCEDURE — 83880 ASSAY OF NATRIURETIC PEPTIDE: CPT

## 2017-12-27 PROCEDURE — 36415 COLL VENOUS BLD VENIPUNCTURE: CPT

## 2017-12-27 PROCEDURE — 80053 COMPREHEN METABOLIC PANEL: CPT

## 2017-12-27 NOTE — XRAY REPORT
LEFT FEMUR:



History: Thigh pain

AP and lateral views of the femur demonstrate normal

mineralization and contours for this patient's age.  No destructive

changes are noted and the adjacent soft tissues are normal.



IMPRESSION:

Normal left femur.

## 2017-12-27 NOTE — EMERGENCY DEPARTMENT REPORT
HPI





- General


Chief Complaint: Extremity Problem,Nontraumatic


Time Seen by Provider: 12/27/17 13:20





- HPI


HPI: 


This is a 47-year-old American male presents to the emergency department, 

driving himself to be seen, with complaint of some pain to the left upper 

posterior leg.  This is been going on intermittently over the past 4-5 days.  

The patient does nightly peritoneal dialysis and says that his legs will tend 

to swell and then go down but he says he can feel a "knot" to the back part of 

this left thigh and it makes it harder for him to an delayed secondary to pain.

  He denies any warmth, skin color change or any rash or lesions.  He has not 

taken anything for his symptoms and presentation.  His nephrologist is Dr. Gonsalez.  He does not have a primary care physician.  No recent travel or sick 

contacts at home.








ED Past Medical Hx





- Past Medical History


Previous Medical History?: Yes


Hx Hypertension: Yes


Hx Renal Disease: Yes (peritoneal dialysis)





- Surgical History


Past Surgical History?: Yes


Additional Surgical History: R elbow sx 2017





- Social History


Smoking Status: Never Smoker


Substance Use Type: None





- Medications


Home Medications: 


 Home Medications











 Medication  Instructions  Recorded  Confirmed  Last Taken  Type


 


HYDROcodone/APAP 5-325 [[place] 1 each PO Q6HR PRN #12 tablet 12/27/17  Unknown Rx





5/325]     














ED Review of Systems


ROS: 


Stated complaint: SWELLING/PAIN LT LEG


Other details as noted in HPI





Comment: All other systems reviewed and negative


Constitutional: denies: chills, fever


Eyes: denies: eye pain, eye discharge, vision change


ENT: denies: ear pain, throat pain


Respiratory: denies: cough, shortness of breath, wheezing


Cardiovascular: denies: chest pain, palpitations


Gastrointestinal: denies: abdominal pain, nausea, diarrhea


Genitourinary: denies: urgency, dysuria


Musculoskeletal: myalgia.  denies: back pain


Skin: denies: rash, lesions


Neurological: denies: headache, weakness, paresthesias





Physical Exam





- Physical Exam


Vital Signs: 


 Vital Signs











  12/27/17





  04:00


 


Temperature 98.2 F


 


Pulse Rate 69


 


Respiratory 18





Rate 


 


Blood Pressure 160/103


 


O2 Sat by Pulse 99





Oximetry 











Physical Exam: 


GENERAL: The patient is well-developed well-nourished.


HENT: Normocephalic.  Atraumatic.    Patient has moist mucous membranes.


EYES: Extraocular motions are intact.  Pupils equal reactive to light 

bilaterally.


NECK: Supple.  Trachea is midline.


CHEST/LUNGS: Clear to auscultation.  There is no respiratory distress noted.


HEART/CARDIOVASCULAR: Regular.  There is no tachycardia.  There is no murmur.


ABDOMEN: Abdomen is soft, nontender.  Patient has normal bowel sounds.  There 

is no abdominal distention.


SKIN: There is bilateral lower cavity edema with left greater than right.  No 

skin color change, rash or lesions.


NEURO: The patient is awake, alert, and oriented.  The patient is cooperative.  

The patient has no focal neurologic deficits.  The patient has normal speech 

and gait.


MUSCULOSKELETAL: There is some reproducible tenderness palpation to the mid 

left hamstring but no obvious deformity.  There is no limitation range of 

motion.  There is no evidence of acute injury.








ED Course


 Vital Signs











  12/27/17





  04:00


 


Temperature 98.2 F


 


Pulse Rate 69


 


Respiratory 18





Rate 


 


Blood Pressure 160/103


 


O2 Sat by Pulse 99





Oximetry 














ED Medical Decision Making





- Lab Data


Result diagrams: 


 12/27/17 04:16





 12/27/17 04:16





- Radiology Data


Radiology results: report reviewed, image reviewed


interpreted by me: 


X-ray of the left femur does not show any fracture, dislocation or any acute 

process.





Left lower extremity venous Doppler is negative for DVT.








- Medical Decision Making


Patient presents with a couple days of pain to the left hamstring and he feels 

a knot.  X-ray of the femur does not show any fracture, dislocation or any 

acute process.  Left lower extremity venous Doppler is negative for DVT.  

Patient's labs are mostly unremarkable except for his kidney function but he 

has nightly peritoneal dialysis.  He does not have any complaints of shortness 

breath, chest pain, nausea or vomiting.  There is no hyperkalemia.  Vital signs 

stable.  He appears safe for discharge home.








- Differential Diagnosis


DVT, muscle spasm, muscle strain


Critical Care Time: No


Critical care attestation.: 


If time is entered above; I have spent that time in minutes in the direct care 

of this critically ill patient, excluding procedure time.








ED Disposition


Clinical Impression: 


 ESRD on dialysis, Pain in left thigh





Hypertension


Qualifiers:


 Hypertension type: essential hypertension Qualified Code(s): I10 - Essential (

primary) hypertension





Disposition: DC-01 TO HOME OR SELFCARE


Is pt being admited?: No


Condition: Stable


Instructions:  Chronic Kidney Disease (ED), Hypertension (ED), Arthralgia (ED)


Additional Instructions: 


These follow-up with a primary care physician.  Continue with your peritoneal 

dialysis.  Return to the emergency Department with any worsening of your 

symptoms or any acute distress. You have been prescribed a medication that is 

sedating and therefore should not be taken prior to driving, working, and 

responsible for children and in no way should be mixed with alcohol of any 

quantity.


Prescriptions: 


HYDROcodone/APAP 5-325 [[place] 5/325] 1 each PO Q6HR PRN #12 tablet


 PRN Reason: Pain


Referrals: 


GREGORIO AGUILAR MD [Primary Care Provider] - ASAP

## 2018-03-03 ENCOUNTER — HOSPITAL ENCOUNTER (EMERGENCY)
Dept: HOSPITAL 5 - ED | Age: 48
LOS: 1 days | Discharge: HOME | End: 2018-03-04
Payer: MEDICARE

## 2018-03-03 VITALS — DIASTOLIC BLOOD PRESSURE: 99 MMHG | SYSTOLIC BLOOD PRESSURE: 158 MMHG

## 2018-03-03 DIAGNOSIS — N18.6: ICD-10-CM

## 2018-03-03 DIAGNOSIS — I12.0: Primary | ICD-10-CM

## 2018-03-03 DIAGNOSIS — Z99.2: ICD-10-CM

## 2018-03-03 LAB
%HYPO/RBC NFR BLD AUTO: (no result) %
ANISOCYTOSIS BLD QL SMEAR: (no result)
BAND NEUTROPHILS # (MANUAL): 0.1 K/MM3
BUN SERPL-MCNC: 64 MG/DL (ref 9–20)
BUN/CREAT SERPL: 4 %
CALCIUM SERPL-MCNC: 6.8 MG/DL (ref 8.4–10.2)
HCT VFR BLD CALC: 23.5 % (ref 35.5–45.6)
HEMOLYSIS INDEX: 4
HGB BLD-MCNC: 7.4 GM/DL (ref 11.8–15.2)
MCH RBC QN AUTO: 24 PG (ref 28–32)
MCHC RBC AUTO-ENTMCNC: 31 % (ref 32–34)
MCV RBC AUTO: 77 FL (ref 84–94)
MYELOCYTES # (MANUAL): 0 K/MM3
PLATELET # BLD: 214 K/MM3 (ref 140–440)
POIKILOCYTOSIS BLD QL SMEAR: (no result)
PROMYELOCYTES # (MANUAL): 0 K/MM3
RBC # BLD AUTO: 3.05 M/MM3 (ref 3.65–5.03)
TOTAL CELLS COUNTED BLD: 100

## 2018-03-03 PROCEDURE — 85025 COMPLETE CBC W/AUTO DIFF WBC: CPT

## 2018-03-03 PROCEDURE — 99284 EMERGENCY DEPT VISIT MOD MDM: CPT

## 2018-03-03 PROCEDURE — 96372 THER/PROPH/DIAG INJ SC/IM: CPT

## 2018-03-03 PROCEDURE — 36415 COLL VENOUS BLD VENIPUNCTURE: CPT

## 2018-03-03 PROCEDURE — 85007 BL SMEAR W/DIFF WBC COUNT: CPT

## 2018-03-03 PROCEDURE — 82550 ASSAY OF CK (CPK): CPT

## 2018-03-03 PROCEDURE — 80048 BASIC METABOLIC PNL TOTAL CA: CPT

## 2018-03-03 NOTE — EMERGENCY DEPARTMENT REPORT
HPI





- General


Chief Complaint: Back Pain/Injury


Time Seen by Provider: 03/03/18 20:10





- HPI


HPI: 








The patient is a 47-year-old male who presents for evaluation of right back 

pain since last night, approximately 12 hours ago.  He states that his pain 

began after attempting to pick something up.  He believes that he strained a 

muscle in his back.  He states that his back pain is moderate in severity, 

sharp in quality, exacerbated with movement, and improved at rest. The patient 

denies blunt trauma to the back, fall, fever, chills, night sweats, saddle 

anesthesia, paresthesias, numbness or tingling in the legs, leg weakness, 

difficulty ambulating, or other focal neurological deficits. The patient also 

denies redness or swelling to the back, IV drug use, history of cancer.








ED Past Medical Hx





- Past Medical History


Hx Hypertension: Yes


Hx Renal Disease: Yes (peritoneal dialysis)


Hx HIV: No


Additional medical history: ANURIC





- Surgical History


Additional Surgical History: R elbow sx 2017





- Social History


Smoking Status: Never Smoker


Substance Use Type: None





- Medications


Home Medications: 


 Home Medications











 Medication  Instructions  Recorded  Confirmed  Last Taken  Type


 


Calcium Carbonate [Calcium] 1 tab PO BID 11/21/13 02/06/17 02/06/17 History


 


Cinacalcet HCl [Sensipar] 60 mg PO QHS 11/21/13 02/06/17 02/06/17 History


 


Labetalol HCl [Trandate TAB] 300 mg PO BID 11/21/13 02/06/17 02/06/17 History


 


NIFEdipine [NIFEdipine ER] 90 mg PO DAILY 11/21/13 02/06/17 02/06/17 History


 


Tamsulosin HCl 0.4 mg PO DAILY 11/21/13 02/06/17 02/06/17 History


 


Valsartan [Diovan] 160 mg PO BID 11/21/13 02/06/17 02/06/17 History


 


cloNIDine [Catapres] 0.1 mg PO DAILY 02/09/15 02/06/17 02/06/17 History


 


Sevelamer Carbonate [Renvela] 800 mg PO TIDWM 10/22/16 02/06/17 02/06/17 History


 


Ciprofloxacin HCl [Ciprofloxacin 250 mg PO BID #28 tablet 02/07/17  Unknown Rx





TAB]     


 


HYDROcodone/APAP 5-325 [[place] 1 each PO Q6HR PRN #10 tablet 03/03/18  Unknown Rx





5-325 mg TAB]     














ED Review of Systems


ROS: 


Stated complaint: ABDOMINAL PAIN


Other details as noted in HPI








Constitutional: denies: fever


ENT: denies: throat or neck pain


Respiratory: denies: cough, shortness of breath


Cardiovascular: denies: chest pain


Endocrine: denies unexplained weight loss or gain


Gastrointestinal: denies: abdominal pain, nausea


Genitourinary: denies: dysuria


Musculoskeletal: denies: leg swelling


Skin: denies: rash


Neurological: reports back pain denies: headache


Hematological/Lymphatic: denies: easy bleeding or easy bruising  


Psych: denies sadness or hopelessness








Physical Exam





- Physical Exam


Vital Signs: 


 Vital Signs











  03/03/18





  19:17


 


Temperature 99.1 F


 


Pulse Rate 81


 


Respiratory 18





Rate 


 


Blood Pressure 149/87


 


O2 Sat by Pulse 95





Oximetry 











Physical Exam: 








General: well-nourished, well-developed, no acute distress


Head: Normocephalic, atraumatic


Eyes: normal sclera


ENT: Mucous membranes are pink and moist 


Neck: trachea midline, neck supple, No neck stiffness, no cervical adenopathy


Respiratory: Breath sounds equal bilaterally, no wheezing, rales, or rhonchi


Cardio: S1 and S2 present, no murmurs, rubs, gallops, capillary refill is brisk


Abdomen: Normoactive bowel sounds, soft abdomen, no abdominal  tenderness, no 

CVA tenderness


Musc: Tenderness to palpation present to right lower thoracic paraspinal 

musculature, normal active range of motion at the hip intact, no spinous step-

off or obvious deformity, ipsi-lateral and contralateral straight leg raise 

tests are negative. On extremity testing, compartments are soft and pliable, no 

obvious gross motor strength deficit, 5+ motor strength, including extension of 

the great toe bilaterally, no muscular atrophy, spasticity, fasciculations, or 

clonus, no obvious gross sensation deficit including web space between 1st and 

2nd toes, reflexes 2+ & symmetric on DTR testing at the knee and ankle joints, 

distal pulses intact.


Skin: No rash


Neuro: no facial drooping, normal speech


Psych: Normal affect








ED Course


 Vital Signs











  03/03/18





  19:17


 


Temperature 99.1 F


 


Pulse Rate 81


 


Respiratory 18





Rate 


 


Blood Pressure 149/87


 


O2 Sat by Pulse 95





Oximetry 














ED Medical Decision Making





- Lab Data


Result diagrams: 


 03/03/18 20:39





 03/03/18 20:39





- Medical Decision Making








The patient was seen and examined by myself.  The patient is placed on a 

cardiac monitor and continuous pulse ox. On initial evaluation, the patient was 

found to be in no distress.  Evaluation orders are placed. The patient is given 

IM dose of morphine for his pain. Lab results revealed elevated BUN/creatinine, 

at baseline for the patient at consistent with long-standing history of end-

stage renal disease.  Otherwise labs exhibited normal potassium level.  The 

patient was reevaluated and reported that their symptoms were markedly 

improved.  The patient is stable for discharge with outpatient follow-up.  The 

patient is given follow-up and return instructions.  The patient expressed 

understanding and agreed with the plan.  The patient is discharged in stable 

condition.





Critical care attestation.: 


If time is entered above; I have spent that time in minutes in the direct care 

of this critically ill patient, excluding procedure time.








ED Disposition


Clinical Impression: 


 ESRD on peritoneal dialysis, Acute right-sided low back pain without sciatica





Disposition: DC-01 TO HOME OR SELFCARE


Is pt being admited?: No


Does the pt Need Aspirin: No


Condition: Stable


Instructions:  Chronic Kidney Disease (ED), Low Back Strain (ED), 

Musculoskeletal Pain (ED)


Prescriptions: 


HYDROcodone/APAP 5-325 [[place] 5-325 mg TAB] 1 each PO Q6HR PRN #10 tablet


 PRN Reason: Pain


Referrals: 


JORGE LANDEROS MD [Primary Care Provider] - 3-5 Days


Time of Disposition: 23:17

## 2018-05-12 ENCOUNTER — HOSPITAL ENCOUNTER (EMERGENCY)
Dept: HOSPITAL 5 - ED | Age: 48
LOS: 1 days | Discharge: HOME | End: 2018-05-13
Payer: MEDICARE

## 2018-05-12 DIAGNOSIS — Z99.2: ICD-10-CM

## 2018-05-12 DIAGNOSIS — D63.1: ICD-10-CM

## 2018-05-12 DIAGNOSIS — N18.6: ICD-10-CM

## 2018-05-12 DIAGNOSIS — I12.0: Primary | ICD-10-CM

## 2018-05-12 LAB
ALBUMIN SERPL-MCNC: 3.5 G/DL (ref 3.9–5)
ALT SERPL-CCNC: 27 UNITS/L (ref 7–56)
BASOPHILS # (AUTO): 0.1 K/MM3 (ref 0–0.1)
BASOPHILS NFR BLD AUTO: 1.5 % (ref 0–1.8)
BUN SERPL-MCNC: 65 MG/DL (ref 9–20)
BUN/CREAT SERPL: 4 %
EOSINOPHIL # BLD AUTO: 0.2 K/MM3 (ref 0–0.4)
EOSINOPHIL NFR BLD AUTO: 3.1 % (ref 0–4.3)
HCT VFR BLD CALC: 22.9 % (ref 35.5–45.6)
HGB BLD-MCNC: 7.2 GM/DL (ref 11.8–15.2)
LYMPHOCYTES # BLD AUTO: 1.1 K/MM3 (ref 1.2–5.4)
LYMPHOCYTES NFR BLD AUTO: 16.4 % (ref 13.4–35)
MCH RBC QN AUTO: 23 PG (ref 28–32)
MCHC RBC AUTO-ENTMCNC: 32 % (ref 32–34)
MCV RBC AUTO: 74 FL (ref 84–94)
MONOCYTES # (AUTO): 0.9 K/MM3 (ref 0–0.8)
MONOCYTES % (AUTO): 12.4 % (ref 0–7.3)
PLATELET # BLD: 192 K/MM3 (ref 140–440)
RBC # BLD AUTO: 3.11 M/MM3 (ref 3.65–5.03)

## 2018-05-12 PROCEDURE — 86850 RBC ANTIBODY SCREEN: CPT

## 2018-05-12 PROCEDURE — 99283 EMERGENCY DEPT VISIT LOW MDM: CPT

## 2018-05-12 PROCEDURE — 86901 BLOOD TYPING SEROLOGIC RH(D): CPT

## 2018-05-12 PROCEDURE — 85025 COMPLETE CBC W/AUTO DIFF WBC: CPT

## 2018-05-12 PROCEDURE — 80053 COMPREHEN METABOLIC PANEL: CPT

## 2018-05-12 PROCEDURE — 36415 COLL VENOUS BLD VENIPUNCTURE: CPT

## 2018-05-12 PROCEDURE — 86900 BLOOD TYPING SEROLOGIC ABO: CPT

## 2018-05-13 VITALS — DIASTOLIC BLOOD PRESSURE: 91 MMHG | SYSTOLIC BLOOD PRESSURE: 146 MMHG

## 2018-05-13 LAB
CALCIUM SERPL-MCNC: 6.3 MG/DL (ref 8.4–10.2)
HEMOLYSIS INDEX: 1

## 2018-05-13 NOTE — EMERGENCY DEPARTMENT REPORT
ED Recheck HPI





- General


Chief Complaint: Recheck/Abnormal Lab/Rx


Stated Complaint: LOW BLOOD COUNT


Time Seen by Provider: 05/13/18 00:19


Source: patient, old records reviewed


Mode of arrival: Ambulatory


Limitations: No Limitations





- History of Present Illness


Initial Comments: 





47-year-old male with a past medical history of end-stage renal disease 

currently on peritoneal dialysis and hypertension presents to the hospital with 

complaints of anemia.  Patient states he had outpatient blood work performed by 

his dialysis nurse and received a phone call today that his hemoglobin was 5.  

Patient did receive Epogen yesterday. Patient reports that he does have some 

lightheadedness when transitioning from a bending down to a sitting up 

position.  However, he denies this and exertion, fatigue, or generalized 

weakness.  No recent melena, hematochezia, or hematemesis.  Last blood 

transfusion was approximately 1.5 years ago.  Last hemoglobin on record 03/03/ 2018 with 7.4.  No pain reported.





- Related Data


 Home Medications











 Medication  Instructions  Recorded  Confirmed  Last Taken


 


Calcium Carbonate [Calcium] 1 tab PO BID 11/21/13 02/06/17 02/06/17


 


Cinacalcet HCl [Sensipar] 60 mg PO QHS 11/21/13 02/06/17 02/06/17


 


Labetalol HCl [Trandate TAB] 300 mg PO BID 11/21/13 02/06/17 02/06/17


 


NIFEdipine [NIFEdipine ER] 90 mg PO DAILY 11/21/13 02/06/17 02/06/17


 


Tamsulosin HCl 0.4 mg PO DAILY 11/21/13 02/06/17 02/06/17


 


Valsartan [Diovan] 160 mg PO BID 11/21/13 02/06/17 02/06/17


 


cloNIDine [Catapres] 0.1 mg PO DAILY 02/09/15 02/06/17 02/06/17


 


Sevelamer Carbonate [Renvela] 800 mg PO TIDWM 10/22/16 02/06/17 02/06/17








 Previous Rx's











 Medication  Instructions  Recorded  Last Taken  Type


 


Ciprofloxacin HCl [Ciprofloxacin 250 mg PO BID #28 tablet 02/07/17 Unknown Rx





TAB]    


 


HYDROcodone/APAP 5-325 [[place] 1 each PO Q6HR PRN #10 tablet 03/03/18 Unknown Rx





5-325 mg TAB]    











 Allergies











Allergy/AdvReac Type Severity Reaction Status Date / Time


 


No Known Allergies Allergy   Verified 05/12/18 22:20














ED Review of Systems


ROS: 


Stated complaint: LOW BLOOD COUNT


Other details as noted in HPI





Comment: All other systems reviewed and negative





ED Past Medical Hx





- Past Medical History


Previous Medical History?: Yes


Hx Hypertension: Yes


Hx Renal Disease: Yes (peritoneal dialysis)


Hx HIV: No


Additional medical history: ANURIC





- Surgical History


Past Surgical History?: Yes


Additional Surgical History: R elbow sx 2017





- Social History


Smoking Status: Never Smoker


Substance Use Type: None





- Medications


Home Medications: 


 Home Medications











 Medication  Instructions  Recorded  Confirmed  Last Taken  Type


 


Calcium Carbonate [Calcium] 1 tab PO BID 11/21/13 02/06/17 02/06/17 History


 


Cinacalcet HCl [Sensipar] 60 mg PO QHS 11/21/13 02/06/17 02/06/17 History


 


Labetalol HCl [Trandate TAB] 300 mg PO BID 11/21/13 02/06/17 02/06/17 History


 


NIFEdipine [NIFEdipine ER] 90 mg PO DAILY 11/21/13 02/06/17 02/06/17 History


 


Tamsulosin HCl 0.4 mg PO DAILY 11/21/13 02/06/17 02/06/17 History


 


Valsartan [Diovan] 160 mg PO BID 11/21/13 02/06/17 02/06/17 History


 


cloNIDine [Catapres] 0.1 mg PO DAILY 02/09/15 02/06/17 02/06/17 History


 


Sevelamer Carbonate [Renvela] 800 mg PO TIDWM 10/22/16 02/06/17 02/06/17 History


 


Ciprofloxacin HCl [Ciprofloxacin 250 mg PO BID #28 tablet 02/07/17  Unknown Rx





TAB]     


 


HYDROcodone/APAP 5-325 [[place] 1 each PO Q6HR PRN #10 tablet 03/03/18  Unknown Rx





5-325 mg TAB]     














ED Physical Exam





- General


Limitations: No Limitations





- Other


Other exam information: 





General: No limitations, patient is alert in no acute distress


Head exam: Atraumatic, normocephalic


Eyes exam: Normal appearance


ENT: Moist mucous membrane, normal oropharynx


Neck exam: Normal inspection, full range of motion, no meningismus nontender


Respiratory exam: Clear to auscultation bilateral, no wheezes, rales, crackles


Cardiovascular: Normal rate and rhythm, normal heart sounds


Abdomen: Soft, nondistended, abdominal peritoneal catheter left lower quadrant, 

nontender abdomen, with normal bowel sounds, no rebound, or guarding


Extremity: Full range of motion normal inspection no deformity, bilateral lower 

extremity edema


Back: Normal Inspection, full range of motion, no tenderness


Neurologic: Alert, oriented x3, cranial nerves intact, no motor or sensory 

deficit


Psychiatric: normal affect, normal mood


Skin: Warm, dry, intact





ED Course


 Vital Signs











  05/12/18 05/13/18





  22:20 00:27


 


Temperature 98.7 F 98.0 F


 


Pulse Rate 75 72


 


Respiratory 16 18





Rate  


 


Blood Pressure 140/86 


 


Blood Pressure  170/100





[Left]  


 


O2 Sat by Pulse 96 100





Oximetry  














- Consultations


Consultation #1: 





05/13/18 00:58


case d/w Dr Reece nephrologist who states labs resulted at 5.7 from 

yesterday blood draw. However since repeat value at pt's baseline he may be d/

hen[place] home. 





ED Recheck MDM





- Core Measures


AMI Core Measures Followed: No





- Differential Diagnosis


anemia of chronic disease, iron deficiency anemia, lab error 





- Medical Decision Making





Pt has minimal sx and hemoglobin similar to previous values. he will be sent 

home to f/u with his nephrologist


Critical Care Time: No


Critical care attestation.: 


If time is entered above; I have spent that time in minutes in the direct care 

of this critically ill patient, excluding procedure time.








ED Disposition


Clinical Impression: 


 ESRD on peritoneal dialysis, Chronic anemia





Disposition: DC-01 TO HOME OR SELFCARE


Is pt being admited?: No


Does the pt Need Aspirin: No


Condition: Stable


Instructions:  End-Stage Kidney Disease (ED), Anemia (ED)


Additional Instructions: 


your hemoglobin (blood count) is around your normal value. You do not require 

admission and blood transfusion today. Follow up with your nephrologist. Return 

if symptoms worsen.


Referrals: 


TRISTAN ROBISON MD [Staff Physician] - 3-5 Days


Time of Disposition: 01:02

## 2018-06-04 ENCOUNTER — HOSPITAL ENCOUNTER (EMERGENCY)
Dept: HOSPITAL 5 - ED | Age: 48
LOS: 1 days | Discharge: HOME | End: 2018-06-05
Payer: MEDICARE

## 2018-06-04 DIAGNOSIS — Z99.2: ICD-10-CM

## 2018-06-04 DIAGNOSIS — D64.9: ICD-10-CM

## 2018-06-04 DIAGNOSIS — I12.0: Primary | ICD-10-CM

## 2018-06-04 DIAGNOSIS — N18.6: ICD-10-CM

## 2018-06-04 LAB
ALBUMIN SERPL-MCNC: 3.4 G/DL (ref 3.9–5)
ALT SERPL-CCNC: 10 UNITS/L (ref 7–56)
APTT BLD: 36.5 SEC. (ref 24.2–36.6)
BAND NEUTROPHILS # (MANUAL): 0 K/MM3
BUN SERPL-MCNC: 59 MG/DL (ref 9–20)
BUN/CREAT SERPL: 4 %
CALCIUM SERPL-MCNC: 7.2 MG/DL (ref 8.4–10.2)
HCT VFR BLD CALC: 22.9 % (ref 35.5–45.6)
HEMOLYSIS INDEX: 6
HGB BLD-MCNC: 7.5 GM/DL (ref 11.8–15.2)
INR PPP: 1 (ref 0.87–1.13)
MCH RBC QN AUTO: 25 PG (ref 28–32)
MCHC RBC AUTO-ENTMCNC: 33 % (ref 32–34)
MCV RBC AUTO: 76 FL (ref 84–94)
MYELOCYTES # (MANUAL): 0 K/MM3
PLATELET # BLD: 269 K/MM3 (ref 140–440)
PROMYELOCYTES # (MANUAL): 0 K/MM3
RBC # BLD AUTO: 3.04 M/MM3 (ref 3.65–5.03)
TOTAL CELLS COUNTED BLD: 100

## 2018-06-04 PROCEDURE — 85025 COMPLETE CBC W/AUTO DIFF WBC: CPT

## 2018-06-04 PROCEDURE — 85610 PROTHROMBIN TIME: CPT

## 2018-06-04 PROCEDURE — 86901 BLOOD TYPING SEROLOGIC RH(D): CPT

## 2018-06-04 PROCEDURE — 85007 BL SMEAR W/DIFF WBC COUNT: CPT

## 2018-06-04 PROCEDURE — 99284 EMERGENCY DEPT VISIT MOD MDM: CPT

## 2018-06-04 PROCEDURE — 36415 COLL VENOUS BLD VENIPUNCTURE: CPT

## 2018-06-04 PROCEDURE — 85730 THROMBOPLASTIN TIME PARTIAL: CPT

## 2018-06-04 PROCEDURE — 86850 RBC ANTIBODY SCREEN: CPT

## 2018-06-04 PROCEDURE — 86900 BLOOD TYPING SEROLOGIC ABO: CPT

## 2018-06-04 PROCEDURE — 80053 COMPREHEN METABOLIC PANEL: CPT

## 2018-06-04 NOTE — EMERGENCY DEPARTMENT REPORT
ED General Adult HPI





- General


Chief complaint: Weakness


Stated complaint: WEAKNESS


Time Seen by Provider: 06/04/18 22:52


Source: patient


Mode of arrival: Ambulatory


Limitations: No Limitations





- History of Present Illness


Initial comments: 





48-year-old patient with end-stage renal disease, home peritoneal dialysis daily

, sent for evaluation by health nurse, who reports that hemoglobin level was 

low today at 6.7 g.  Patient has recurrent bouts of anemia, has been previously 

treated with Epogen, gets weekly hemoglobin counts, last was on Friday, and 

this was reason for called by the dialysis nurse today.  Patient was Evaluated 

here one month ago for same complaints, but found to have stable hemoglobin at 

that evaluation and was also discharged at that time.  





Patient performed some dialysis nightly, with 4 cycles each evening, works 

during the day, and has no difficulty performing his regular duties with no 

lightheadedness or weakness.





Patient has no other complaints.








Severity scale (0 -10): 0





- Related Data


 Home Medications











 Medication  Instructions  Recorded  Confirmed  Last Taken


 


Calcium Carbonate [Calcium] 1 tab PO BID 11/21/13 02/06/17 02/06/17


 


Cinacalcet HCl [Sensipar] 60 mg PO QHS 11/21/13 02/06/17 02/06/17


 


Labetalol HCl [Trandate TAB] 300 mg PO BID 11/21/13 02/06/17 02/06/17


 


NIFEdipine [NIFEdipine ER] 90 mg PO DAILY 11/21/13 02/06/17 02/06/17


 


Tamsulosin HCl 0.4 mg PO DAILY 11/21/13 02/06/17 02/06/17


 


Valsartan [Diovan] 160 mg PO BID 11/21/13 02/06/17 02/06/17


 


cloNIDine [Catapres] 0.1 mg PO DAILY 02/09/15 02/06/17 02/06/17


 


Sevelamer Carbonate [Renvela] 800 mg PO TIDWM 10/22/16 02/06/17 02/06/17








 Previous Rx's











 Medication  Instructions  Recorded  Last Taken  Type


 


Ciprofloxacin HCl [Ciprofloxacin 250 mg PO BID #28 tablet 02/07/17 Unknown Rx





TAB]    


 


HYDROcodone/APAP 5-325 [[place] 1 each PO Q6HR PRN #10 tablet 03/03/18 Unknown Rx





5-325 mg TAB]    











 Allergies











Allergy/AdvReac Type Severity Reaction Status Date / Time


 


No Known Allergies Allergy   Verified 05/12/18 22:20














ED Review of Systems


ROS: 


Stated complaint: WEAKNESS


Other details as noted in HPI





Comment: All other systems reviewed and negative


Constitutional: denies: chills, fever


ENT: denies: ear pain, throat pain


Respiratory: denies: cough, shortness of breath, wheezing


Cardiovascular: denies: chest pain, palpitations


Endocrine: no symptoms reported


Gastrointestinal: denies: abdominal pain, nausea, diarrhea


Genitourinary: denies: urgency, dysuria


Musculoskeletal: denies: back pain, joint swelling, arthralgia


Skin: denies: rash, lesions


Neurological: denies: headache, weakness, paresthesias


Psychiatric: denies: anxiety, depression


Hematological/Lymphatic: denies: easy bleeding, easy bruising





ED Past Medical Hx





- Past Medical History


Previous Medical History?: Yes


Hx Hypertension: Yes


Hx Renal Disease: Yes (peritoneal dialysis)


Hx HIV: No


Additional medical history: ANURIC





- Surgical History


Past Surgical History?: Yes


Additional Surgical History: R elbow sx 2017 Peritoneal Catheter





- Social History


Smoking Status: Never Smoker


Substance Use Type: None





- Medications


Home Medications: 


 Home Medications











 Medication  Instructions  Recorded  Confirmed  Last Taken  Type


 


Calcium Carbonate [Calcium] 1 tab PO BID 11/21/13 02/06/17 02/06/17 History


 


Cinacalcet HCl [Sensipar] 60 mg PO QHS 11/21/13 02/06/17 02/06/17 History


 


Labetalol HCl [Trandate TAB] 300 mg PO BID 11/21/13 02/06/17 02/06/17 History


 


NIFEdipine [NIFEdipine ER] 90 mg PO DAILY 11/21/13 02/06/17 02/06/17 History


 


Tamsulosin HCl 0.4 mg PO DAILY 11/21/13 02/06/17 02/06/17 History


 


Valsartan [Diovan] 160 mg PO BID 11/21/13 02/06/17 02/06/17 History


 


cloNIDine [Catapres] 0.1 mg PO DAILY 02/09/15 02/06/17 02/06/17 History


 


Sevelamer Carbonate [Renvela] 800 mg PO TIDWM 10/22/16 02/06/17 02/06/17 History


 


Ciprofloxacin HCl [Ciprofloxacin 250 mg PO BID #28 tablet 02/07/17  Unknown Rx





TAB]     


 


HYDROcodone/APAP 5-325 [[place] 1 each PO Q6HR PRN #10 tablet 03/03/18  Unknown Rx





5-325 mg TAB]     














ED Physical Exam





- General


Limitations: No Limitations


General appearance: alert, in no apparent distress





- Head


Head exam: Present: atraumatic, normocephalic





- Eye


Eye exam: Present: PERRL, EOMI





- ENT


ENT exam: Present: normal exam





- Neck


Neck exam: Present: normal inspection.  Absent: tenderness





- Respiratory


Respiratory exam: Present: normal lung sounds bilaterally.  Absent: respiratory 

distress, wheezes, rales, rhonchi, chest wall tenderness





- Cardiovascular


Cardiovascular Exam: Present: regular rate, normal heart sounds.  Absent: S3, S4





- GI/Abdominal


GI/Abdominal exam: Present: soft, other (peritoneal dialysis catheter present 

mid abdomen)





- Rectal


Rectal exam: Present: deferred





- Extremities Exam


Extremities exam: Present: normal inspection.  Absent: pedal edema





ED Course


 Vital Signs











  06/04/18 06/04/18 06/04/18





  15:24 20:25 22:01


 


Temperature 98.6 F 97.6 F 97 F L


 


Pulse Rate 72 72 72


 


Respiratory 20 18 16





Rate   


 


Blood Pressure 148/88 199/122 


 


Blood Pressure   202/125





[Left]   


 


O2 Sat by Pulse 97 99 98





Oximetry   














  06/04/18 06/04/18 06/04/18





  22:19 22:20 22:59


 


Temperature   


 


Pulse Rate 72 72 59 L


 


Respiratory   16





Rate   


 


Blood Pressure 175/92 175/92 


 


Blood Pressure   167/101





[Left]   


 


O2 Sat by Pulse   96





Oximetry   














  06/04/18





  23:56


 


Temperature 


 


Pulse Rate 60


 


Respiratory 16





Rate 


 


Blood Pressure 


 


Blood Pressure 151/95





[Left] 


 


O2 Sat by Pulse 96





Oximetry 














ED Medical Decision Making





- Lab Data


Result diagrams: 


 06/04/18 16:15





 06/04/18 16:15





- Medical Decision Making





This end stage renal disease patient with home peritoneal dialysis, was 

referred for evaluation of possible low hemoglobin and need for transfusion, 

with a reported 6.7 g hemoglobin from dialysis, but evaluation in emergency 

department shows hemoglobin of 7.5 g, patient is stable physically, good vital 

signs with blood pressure 159/72, heart rate 68, and in no acute distress, with 

a normal examination with clear lung fields, normal heart beat, in no acute 

distress.  He does not need transfusion, and is stable for discharge home, and 

as he is performing his regular work duties, needs no restrictions either.  He 

gets weekly checks by his health nurse, and next examination as scheduled in 3 

days.  He may follow as currently scheduled.





- Differential Diagnosis


anemia, severe anemia


Critical Care Time: No


Critical care attestation.: 


If time is entered above; I have spent that time in minutes in the direct care 

of this critically ill patient, excluding procedure time.








ED Disposition


Clinical Impression: 


Anemia, chronic renal failure


Qualifiers:


 Chronic kidney disease stage: unspecified stage Qualified Code(s): N18.9 - 

Chronic kidney disease, unspecified; D63.1 - Anemia in chronic kidney disease





Disposition: DC-01 TO HOME OR SELFCARE


Is pt being admited?: No


Does the pt Need Aspirin: No


Condition: Stable


Additional Instructions: 


Blood work is stable today, hemoglobin is 7.5 g, and you do not need 

transfusion today.


You may continue to follow with your regular activities, vital signs are stable

, but we are recommend rest for the next 2 days, and observe if you develop any 

additional symptoms.


Please recheck with your dialysis nurse for regular blood work as previously 

scheduled.


Referrals: 


PRIMARY CARE,MD [Primary Care Provider] - 3-5 Days


Forms:  Work/School Release Form(ED)


Time of Disposition: 00:49

## 2018-06-05 VITALS — DIASTOLIC BLOOD PRESSURE: 94 MMHG | SYSTOLIC BLOOD PRESSURE: 152 MMHG

## 2020-07-03 ENCOUNTER — HOSPITAL ENCOUNTER (EMERGENCY)
Dept: HOSPITAL 5 - ED | Age: 50
Discharge: HOME | End: 2020-07-03
Payer: MEDICARE

## 2020-07-03 VITALS — DIASTOLIC BLOOD PRESSURE: 89 MMHG | SYSTOLIC BLOOD PRESSURE: 133 MMHG

## 2020-07-03 DIAGNOSIS — M79.674: Primary | ICD-10-CM

## 2020-07-03 DIAGNOSIS — I10: ICD-10-CM
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
## 2020-07-03 DIAGNOSIS — Z98.890: ICD-10-CM
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
## 2020-07-03 DIAGNOSIS — Z79.899: ICD-10-CM

## 2020-07-03 PROCEDURE — 99283 EMERGENCY DEPT VISIT LOW MDM: CPT

## 2020-07-03 NOTE — XRAY REPORT
XR toe(s) 2+V RT



INDICATION / CLINICAL INFORMATION:

pain to big toe.



COMPARISON:

None available.

 

FINDINGS:

BONES/JOINT(S): No acute fracture or subluxation. No significant degenerative changes. No focal bone 
destruction to suggest osteomyelitis.



SOFT TISSUES: No radiopaque foreign body or soft tissue gas identified.



ADDITIONAL FINDINGS: None.







Signer Name: Colt De MD 

Signed: 7/3/2020 11:55 AM

Workstation Name: [id number]
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
